# Patient Record
Sex: MALE | Race: WHITE | NOT HISPANIC OR LATINO | URBAN - METROPOLITAN AREA
[De-identification: names, ages, dates, MRNs, and addresses within clinical notes are randomized per-mention and may not be internally consistent; named-entity substitution may affect disease eponyms.]

---

## 2020-12-31 ENCOUNTER — EMERGENCY (EMERGENCY)
Facility: HOSPITAL | Age: 20
LOS: 1 days | Discharge: HOME | End: 2020-12-31
Attending: EMERGENCY MEDICINE | Admitting: EMERGENCY MEDICINE
Payer: COMMERCIAL

## 2020-12-31 VITALS
SYSTOLIC BLOOD PRESSURE: 137 MMHG | OXYGEN SATURATION: 98 % | DIASTOLIC BLOOD PRESSURE: 81 MMHG | HEIGHT: 70 IN | TEMPERATURE: 98 F | WEIGHT: 160.06 LBS | RESPIRATION RATE: 18 BRPM | HEART RATE: 77 BPM

## 2020-12-31 VITALS
TEMPERATURE: 98 F | HEART RATE: 68 BPM | DIASTOLIC BLOOD PRESSURE: 73 MMHG | SYSTOLIC BLOOD PRESSURE: 134 MMHG | RESPIRATION RATE: 18 BRPM | OXYGEN SATURATION: 99 %

## 2020-12-31 DIAGNOSIS — R07.89 OTHER CHEST PAIN: ICD-10-CM

## 2020-12-31 DIAGNOSIS — R06.02 SHORTNESS OF BREATH: ICD-10-CM

## 2020-12-31 DIAGNOSIS — U07.1 COVID-19: ICD-10-CM

## 2020-12-31 LAB
ALBUMIN SERPL ELPH-MCNC: 4.2 G/DL — SIGNIFICANT CHANGE UP (ref 3.3–5)
ALP SERPL-CCNC: 56 U/L — SIGNIFICANT CHANGE UP (ref 40–120)
ALT FLD-CCNC: 12 U/L — SIGNIFICANT CHANGE UP (ref 10–45)
ANION GAP SERPL CALC-SCNC: 11 MMOL/L — SIGNIFICANT CHANGE UP (ref 5–17)
AST SERPL-CCNC: 16 U/L — SIGNIFICANT CHANGE UP (ref 10–40)
BASOPHILS # BLD AUTO: 0.01 K/UL — SIGNIFICANT CHANGE UP (ref 0–0.2)
BASOPHILS NFR BLD AUTO: 0.2 % — SIGNIFICANT CHANGE UP (ref 0–2)
BILIRUB SERPL-MCNC: 0.2 MG/DL — SIGNIFICANT CHANGE UP (ref 0.2–1.2)
BUN SERPL-MCNC: 13 MG/DL — SIGNIFICANT CHANGE UP (ref 7–23)
CALCIUM SERPL-MCNC: 9.2 MG/DL — SIGNIFICANT CHANGE UP (ref 8.4–10.5)
CHLORIDE SERPL-SCNC: 99 MMOL/L — SIGNIFICANT CHANGE UP (ref 96–108)
CO2 SERPL-SCNC: 28 MMOL/L — SIGNIFICANT CHANGE UP (ref 22–31)
CREAT SERPL-MCNC: 0.93 MG/DL — SIGNIFICANT CHANGE UP (ref 0.5–1.3)
D DIMER BLD IA.RAPID-MCNC: <150 NG/ML DDU — SIGNIFICANT CHANGE UP
EOSINOPHIL # BLD AUTO: 0.11 K/UL — SIGNIFICANT CHANGE UP (ref 0–0.5)
EOSINOPHIL NFR BLD AUTO: 2.4 % — SIGNIFICANT CHANGE UP (ref 0–6)
GLUCOSE SERPL-MCNC: 96 MG/DL — SIGNIFICANT CHANGE UP (ref 70–99)
HCT VFR BLD CALC: 44.5 % — SIGNIFICANT CHANGE UP (ref 39–50)
HGB BLD-MCNC: 15.3 G/DL — SIGNIFICANT CHANGE UP (ref 13–17)
IMM GRANULOCYTES NFR BLD AUTO: 0.2 % — SIGNIFICANT CHANGE UP (ref 0–1.5)
LYMPHOCYTES # BLD AUTO: 2.18 K/UL — SIGNIFICANT CHANGE UP (ref 1–3.3)
LYMPHOCYTES # BLD AUTO: 47.3 % — HIGH (ref 13–44)
MCHC RBC-ENTMCNC: 29.6 PG — SIGNIFICANT CHANGE UP (ref 27–34)
MCHC RBC-ENTMCNC: 34.4 GM/DL — SIGNIFICANT CHANGE UP (ref 32–36)
MCV RBC AUTO: 86.1 FL — SIGNIFICANT CHANGE UP (ref 80–100)
MONOCYTES # BLD AUTO: 0.43 K/UL — SIGNIFICANT CHANGE UP (ref 0–0.9)
MONOCYTES NFR BLD AUTO: 9.3 % — SIGNIFICANT CHANGE UP (ref 2–14)
NEUTROPHILS # BLD AUTO: 1.87 K/UL — SIGNIFICANT CHANGE UP (ref 1.8–7.4)
NEUTROPHILS NFR BLD AUTO: 40.6 % — LOW (ref 43–77)
NRBC # BLD: 0 /100 WBCS — SIGNIFICANT CHANGE UP (ref 0–0)
PLATELET # BLD AUTO: 188 K/UL — SIGNIFICANT CHANGE UP (ref 150–400)
POTASSIUM SERPL-MCNC: 3.8 MMOL/L — SIGNIFICANT CHANGE UP (ref 3.5–5.3)
POTASSIUM SERPL-SCNC: 3.8 MMOL/L — SIGNIFICANT CHANGE UP (ref 3.5–5.3)
PROT SERPL-MCNC: 6.8 G/DL — SIGNIFICANT CHANGE UP (ref 6–8.3)
RBC # BLD: 5.17 M/UL — SIGNIFICANT CHANGE UP (ref 4.2–5.8)
RBC # FLD: 11.9 % — SIGNIFICANT CHANGE UP (ref 10.3–14.5)
SODIUM SERPL-SCNC: 138 MMOL/L — SIGNIFICANT CHANGE UP (ref 135–145)
TROPONIN T SERPL-MCNC: <0.01 NG/ML — SIGNIFICANT CHANGE UP (ref 0–0.01)
WBC # BLD: 4.61 K/UL — SIGNIFICANT CHANGE UP (ref 3.8–10.5)
WBC # FLD AUTO: 4.61 K/UL — SIGNIFICANT CHANGE UP (ref 3.8–10.5)

## 2020-12-31 PROCEDURE — 80053 COMPREHEN METABOLIC PANEL: CPT

## 2020-12-31 PROCEDURE — 85025 COMPLETE CBC W/AUTO DIFF WBC: CPT

## 2020-12-31 PROCEDURE — 36415 COLL VENOUS BLD VENIPUNCTURE: CPT

## 2020-12-31 PROCEDURE — 71045 X-RAY EXAM CHEST 1 VIEW: CPT | Mod: 26

## 2020-12-31 PROCEDURE — 85379 FIBRIN DEGRADATION QUANT: CPT

## 2020-12-31 PROCEDURE — 71045 X-RAY EXAM CHEST 1 VIEW: CPT

## 2020-12-31 PROCEDURE — 84484 ASSAY OF TROPONIN QUANT: CPT

## 2020-12-31 PROCEDURE — 99283 EMERGENCY DEPT VISIT LOW MDM: CPT | Mod: 25

## 2020-12-31 PROCEDURE — 99285 EMERGENCY DEPT VISIT HI MDM: CPT | Mod: 25

## 2020-12-31 NOTE — ED ADULT NURSE NOTE - OBJECTIVE STATEMENT
21 y/o male w/ no PMH presents to ED w/ c/o generalized body aches/malaise and sinus congestion for a week, now presenting w/ substernal generalized CP that began this am. pt reports +COVID19 test 4 days ago, denies fevers/diarrhea/SOB/difficulty breathing/cough 19 y/o male w/ no PMH presents to ED w/ c/o generalized body aches/malaise and sinus congestion for a week, now presenting w/ substernal generalized CP that began 2 hours PTA. pt reports +COVID19 test 4 days ago, denies fevers/diarrhea/SOB/difficulty breathing/cough

## 2020-12-31 NOTE — ED PROVIDER NOTE - PATIENT PORTAL LINK FT
You can access the FollowMyHealth Patient Portal offered by [facility] by registering at the following website: [email or web address]. By joining [facility]’s FollowMyHealth portal, you will also be able to view your health information using other applications (apps) compatible with our system.

## 2020-12-31 NOTE — ED PROVIDER NOTE - OBJECTIVE STATEMENT
21 y/o male with no PMHx who is otherwise healthy is present in the ER c/o chest pressure that occurred x2 hours prior to ED arrival. Last week he found out that his father who he resides with was + for covid. On Saturday he started to feel fatigue and then on Sunday he was tested and found to be covid +. 19 y/o male with no PMHx who is otherwise healthy is present in the ER c/o chest pressure that occurred x2 hours prior to ED arrival. Last week he found out that his father who he resides with was + for covid. On Saturday he started to feel fatigue and then on Sunday he was tested and found to be covid +. He has been self-medicating with motrin, however today he felt pressure to the b/l upper chest. He felt as something heavy was sitting on his chest causing him to have sob. Since his arrival to the ED, the pressure and sob has improved. He admits to hx of smoking cigarettes, vaping and marijuana. He denies the following: rash, weight loss, fever, chills, dizziness, syncope, back pain, nausea/vomiting, numbness/tingling to extremities, difficulty walking, abdominal pain, diarrhea, urinary symptoms and no recent travel or drug use such as cocaine.

## 2020-12-31 NOTE — ED ADULT NURSE NOTE - CHPI ED NUR SYMPTOMS NEG
no back pain/no chills/no diaphoresis/no dizziness/no fever/no nausea/no shortness of breath/no syncope/no vomiting

## 2020-12-31 NOTE — ED PROVIDER NOTE - NSFOLLOWUPINSTRUCTIONS_ED_ALL_ED_FT
RETURN TO THE EMERGENCY DEPARTMENT FOR DIFFICULTY SPEAKING IN FULL SENTENCES, FEELING SHORT OF BREATH WALKING ROOM TO ROOM AT HOME OR UPSTAIRS, OR OTHER CONCERNING SYMPTOMS.     PLEASE CONTINUE TO ISOLATE YOURSELF AT HOME FOR 10 DAYS, OR LONGER IF YOU CONTINUE TO HAVE SYMPTOMS.     YOU MAY DISCONTINUE ISOLATION WHEN:  1. AT LEAST 3 DAYS (72 HOURS) HAVE PASSED SINCE RECOVERY, WHICH IS DEFINED AS HAVING NO FEVER WITHOUT THE USE OF FEVER-REDUCING MEDICATIONS (SUCH AS TYLENOL OR MOTRIN) AND RESPIRATORY SYMPTOMS (COUGH, SHORTNESS OF BREATH), AND  2. AT LEAST 7 DAYS HAVE PASSED SINCE THE SYMPTOMS FIRST BEGAN  BOTH CONDITIONS MUST BE MET TO DISCONTINUE ISOLATION    DO NOT LEAVE HOME. DO NOT TAKE PUBLIC TRANSPORTATION. IF YOU HAVE OTHERS IN YOUR HOME REMAIN 6-10 FEET FROM THEM AND USE THE MASK AT HOME. IF YOU MUST LEAVE THE HOUSE, USE THE MASK.     Check the CDC website (cdc.gov) for updates.    General information for spread of infection:     Avoid close contact with people who are sick.  Avoid touching your eyes, nose, and mouth.  Stay home when you are sick.  Cover your cough or sneeze with a tissue, then throw the tissue in the trash.  Clean and disinfect frequently touched objects and surfaces using a regular household cleaning spray or wipe.  Follow CDC’s recommendations for using a facemask.  CDC does not recommend that people who are well wear a facemask to protect themselves from respiratory diseases, including COVID-19.  Facemasks should be used by people who show symptoms of COVID-19 to help prevent the spread of the disease to  others. The use of facemasks is also crucial for health workers and people who are taking care of someone in close settings (at home or in a health care facility).  Wash your hands often with soap and water for at least 20 seconds, especially after going to the bathroom; before eating; and after blowing your nose, coughing, or sneezing.  If soap and water are not readily available, use an alcohol-based hand  with at least 60% alcohol. Always wash hands with soap and water if hands are visibly dirty.

## 2020-12-31 NOTE — ED PROVIDER NOTE - NS ED ROS FT
General: no fever, chills, confusion  Cardiac: reports chest pressure, chest tightness, palpitations  Lungs: reports sob, denies difficulty breathing  Abdomen: no abdominal pain, nausea, vomiting, diarrhea, constipation, nml BM  : no dysuria, urinary frequency/urgency    All other systems negative except as per HPI

## 2020-12-31 NOTE — ED PROVIDER NOTE - CLINICAL SUMMARY MEDICAL DECISION MAKING FREE TEXT BOX
21 y/o male with no PMHx who was found covid+ x5 days ago is here in the ED c/o chest pressure and sob x2 hours prior to ED arrival. 21 y/o male with no PMHx who was found covid+ x5 days ago is here in the ED c/o chest pressure and sob x2 hours prior to ED arrival. No evidence of pneumonia, sepsis or meningitis;  D-dimer, trop and labs wnml. Do not suspect pericarditis, myocarditis, pe, acs, pneumothorax. Consider costochondritis. Recommend continue supportive care. The patient is non toxic appearing. Supportive care including increased fluids and over the counter therapy was advised and discussed. I have discussed the discharge plan with the patient. The patient agrees with the plan, as discussed.  The patient understands Emergency Department diagnosis is a preliminary diagnosis often based on limited information and that the patient must adhere to the follow-up plan as discussed.  The patient understands that if the symptoms worsen or if prescribed medications do not have the desired/planned effect that the patient may return to the Emergency Department at any time for further evaluation and treatment.

## 2025-08-09 ENCOUNTER — EMERGENCY (EMERGENCY)
Facility: HOSPITAL | Age: 25
LOS: 1 days | End: 2025-08-09
Admitting: EMERGENCY MEDICINE
Payer: COMMERCIAL

## 2025-08-09 VITALS
HEART RATE: 71 BPM | WEIGHT: 169.98 LBS | TEMPERATURE: 99 F | SYSTOLIC BLOOD PRESSURE: 124 MMHG | RESPIRATION RATE: 18 BRPM | OXYGEN SATURATION: 98 % | DIASTOLIC BLOOD PRESSURE: 80 MMHG

## 2025-08-09 PROCEDURE — 99282 EMERGENCY DEPT VISIT SF MDM: CPT | Mod: 25

## 2025-08-09 PROCEDURE — 12011 RPR F/E/E/N/L/M 2.5 CM/<: CPT

## 2025-08-09 PROCEDURE — 99283 EMERGENCY DEPT VISIT LOW MDM: CPT | Mod: 25

## 2025-08-13 DIAGNOSIS — S01.112A LACERATION WITHOUT FOREIGN BODY OF LEFT EYELID AND PERIOCULAR AREA, INITIAL ENCOUNTER: ICD-10-CM
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
